# Patient Record
Sex: FEMALE | Race: WHITE | NOT HISPANIC OR LATINO | Employment: OTHER | URBAN - METROPOLITAN AREA
[De-identification: names, ages, dates, MRNs, and addresses within clinical notes are randomized per-mention and may not be internally consistent; named-entity substitution may affect disease eponyms.]

---

## 2017-03-23 PROBLEM — Z86.73 HISTORY OF TIA (TRANSIENT ISCHEMIC ATTACK): Status: ACTIVE | Noted: 2017-03-23

## 2017-08-22 PROBLEM — R06.09 DOE (DYSPNEA ON EXERTION): Status: ACTIVE | Noted: 2017-08-22

## 2017-11-17 PROBLEM — J44.9 CHRONIC OBSTRUCTIVE PULMONARY DISEASE: Status: ACTIVE | Noted: 2017-11-17

## 2017-11-17 PROBLEM — Z99.81 OXYGEN DEPENDENT: Status: ACTIVE | Noted: 2017-11-17

## 2018-07-24 PROBLEM — E55.9 VITAMIN D DEFICIENCY: Status: ACTIVE | Noted: 2018-07-24

## 2019-11-11 PROBLEM — N30.00 ACUTE CYSTITIS WITHOUT HEMATURIA: Status: ACTIVE | Noted: 2019-11-11

## 2019-11-11 PROBLEM — I50.9 ACUTE ON CHRONIC HEART FAILURE: Status: ACTIVE | Noted: 2019-11-11

## 2019-11-12 VITALS
OXYGEN SATURATION: 92 % | RESPIRATION RATE: 18 BRPM | DIASTOLIC BLOOD PRESSURE: 45 MMHG | SYSTOLIC BLOOD PRESSURE: 107 MMHG | TEMPERATURE: 99 F | HEART RATE: 66 BPM

## 2019-11-12 VITALS
TEMPERATURE: 98 F | RESPIRATION RATE: 16 BRPM | SYSTOLIC BLOOD PRESSURE: 111 MMHG | TEMPERATURE: 98 F | DIASTOLIC BLOOD PRESSURE: 55 MMHG | SYSTOLIC BLOOD PRESSURE: 116 MMHG | RESPIRATION RATE: 16 BRPM | OXYGEN SATURATION: 100 % | HEART RATE: 56 BPM | HEART RATE: 75 BPM | OXYGEN SATURATION: 96 % | DIASTOLIC BLOOD PRESSURE: 51 MMHG

## 2019-11-13 VITALS
DIASTOLIC BLOOD PRESSURE: 47 MMHG | SYSTOLIC BLOOD PRESSURE: 119 MMHG | DIASTOLIC BLOOD PRESSURE: 49 MMHG | SYSTOLIC BLOOD PRESSURE: 125 MMHG | HEART RATE: 56 BPM | TEMPERATURE: 99 F | OXYGEN SATURATION: 93 % | TEMPERATURE: 98 F | OXYGEN SATURATION: 94 % | RESPIRATION RATE: 17 BRPM | HEART RATE: 62 BPM

## 2019-11-13 VITALS
RESPIRATION RATE: 18 BRPM | DIASTOLIC BLOOD PRESSURE: 52 MMHG | OXYGEN SATURATION: 92 % | SYSTOLIC BLOOD PRESSURE: 122 MMHG | TEMPERATURE: 98 F | HEART RATE: 62 BPM

## 2019-11-13 VITALS
RESPIRATION RATE: 18 BRPM | DIASTOLIC BLOOD PRESSURE: 41 MMHG | OXYGEN SATURATION: 96 % | SYSTOLIC BLOOD PRESSURE: 108 MMHG | TEMPERATURE: 98 F | HEART RATE: 61 BPM

## 2019-11-13 PROBLEM — J81.0 ACUTE PULMONARY EDEMA: Status: ACTIVE | Noted: 2019-11-13

## 2019-11-14 VITALS
OXYGEN SATURATION: 95 % | SYSTOLIC BLOOD PRESSURE: 121 MMHG | TEMPERATURE: 101 F | SYSTOLIC BLOOD PRESSURE: 115 MMHG | DIASTOLIC BLOOD PRESSURE: 55 MMHG | OXYGEN SATURATION: 91 % | HEART RATE: 58 BPM | DIASTOLIC BLOOD PRESSURE: 57 MMHG | HEART RATE: 64 BPM | RESPIRATION RATE: 16 BRPM | TEMPERATURE: 98 F | RESPIRATION RATE: 20 BRPM

## 2019-11-14 PROBLEM — R33.9 URINE RETENTION: Status: ACTIVE | Noted: 2019-11-14

## 2019-11-15 VITALS
DIASTOLIC BLOOD PRESSURE: 57 MMHG | TEMPERATURE: 98 F | SYSTOLIC BLOOD PRESSURE: 144 MMHG | HEART RATE: 66 BPM | RESPIRATION RATE: 18 BRPM | OXYGEN SATURATION: 96 %

## 2019-11-15 VITALS
DIASTOLIC BLOOD PRESSURE: 43 MMHG | SYSTOLIC BLOOD PRESSURE: 103 MMHG | OXYGEN SATURATION: 95 % | TEMPERATURE: 99 F | RESPIRATION RATE: 17 BRPM | HEART RATE: 55 BPM

## 2019-11-15 PROBLEM — R82.71 ASYMPTOMATIC BACTERIURIA: Status: ACTIVE | Noted: 2019-11-11

## 2019-11-16 VITALS
OXYGEN SATURATION: 93 % | HEART RATE: 51 BPM | TEMPERATURE: 98 F | DIASTOLIC BLOOD PRESSURE: 49 MMHG | RESPIRATION RATE: 15 BRPM | SYSTOLIC BLOOD PRESSURE: 124 MMHG

## 2019-11-16 VITALS
SYSTOLIC BLOOD PRESSURE: 132 MMHG | OXYGEN SATURATION: 95 % | TEMPERATURE: 98 F | SYSTOLIC BLOOD PRESSURE: 122 MMHG | HEART RATE: 67 BPM | RESPIRATION RATE: 18 BRPM | DIASTOLIC BLOOD PRESSURE: 52 MMHG | HEART RATE: 53 BPM | TEMPERATURE: 98 F | OXYGEN SATURATION: 95 % | RESPIRATION RATE: 15 BRPM | DIASTOLIC BLOOD PRESSURE: 48 MMHG

## 2019-11-16 VITALS
SYSTOLIC BLOOD PRESSURE: 121 MMHG | HEART RATE: 54 BPM | DIASTOLIC BLOOD PRESSURE: 54 MMHG | TEMPERATURE: 98 F | OXYGEN SATURATION: 92 % | RESPIRATION RATE: 16 BRPM

## 2019-11-16 VITALS
OXYGEN SATURATION: 91 % | DIASTOLIC BLOOD PRESSURE: 44 MMHG | TEMPERATURE: 98 F | RESPIRATION RATE: 14 BRPM | HEART RATE: 61 BPM | SYSTOLIC BLOOD PRESSURE: 124 MMHG

## 2019-11-16 VITALS
TEMPERATURE: 98 F | SYSTOLIC BLOOD PRESSURE: 123 MMHG | DIASTOLIC BLOOD PRESSURE: 40 MMHG | RESPIRATION RATE: 16 BRPM | HEART RATE: 54 BPM | OXYGEN SATURATION: 98 %

## 2019-11-16 VITALS
TEMPERATURE: 98 F | SYSTOLIC BLOOD PRESSURE: 108 MMHG | DIASTOLIC BLOOD PRESSURE: 39 MMHG | RESPIRATION RATE: 17 BRPM | HEART RATE: 56 BPM | OXYGEN SATURATION: 93 %

## 2019-11-17 VITALS
DIASTOLIC BLOOD PRESSURE: 48 MMHG | OXYGEN SATURATION: 91 % | TEMPERATURE: 98 F | SYSTOLIC BLOOD PRESSURE: 137 MMHG | RESPIRATION RATE: 18 BRPM | HEART RATE: 61 BPM

## 2019-11-17 VITALS
HEART RATE: 65 BPM | DIASTOLIC BLOOD PRESSURE: 48 MMHG | RESPIRATION RATE: 18 BRPM | TEMPERATURE: 98 F | SYSTOLIC BLOOD PRESSURE: 130 MMHG | TEMPERATURE: 99 F | OXYGEN SATURATION: 97 % | OXYGEN SATURATION: 90 % | SYSTOLIC BLOOD PRESSURE: 110 MMHG | RESPIRATION RATE: 16 BRPM | HEART RATE: 51 BPM | DIASTOLIC BLOOD PRESSURE: 56 MMHG

## 2019-11-17 VITALS
HEART RATE: 58 BPM | TEMPERATURE: 98 F | RESPIRATION RATE: 16 BRPM | OXYGEN SATURATION: 99 % | DIASTOLIC BLOOD PRESSURE: 56 MMHG | SYSTOLIC BLOOD PRESSURE: 130 MMHG

## 2019-11-17 VITALS
TEMPERATURE: 98 F | RESPIRATION RATE: 14 BRPM | DIASTOLIC BLOOD PRESSURE: 47 MMHG | SYSTOLIC BLOOD PRESSURE: 118 MMHG | OXYGEN SATURATION: 95 % | HEART RATE: 56 BPM

## 2019-11-18 VITALS
TEMPERATURE: 98 F | HEART RATE: 52 BPM | RESPIRATION RATE: 16 BRPM | OXYGEN SATURATION: 96 % | DIASTOLIC BLOOD PRESSURE: 50 MMHG | SYSTOLIC BLOOD PRESSURE: 122 MMHG

## 2019-11-18 VITALS
HEART RATE: 58 BPM | OXYGEN SATURATION: 92 % | DIASTOLIC BLOOD PRESSURE: 44 MMHG | TEMPERATURE: 99 F | RESPIRATION RATE: 18 BRPM | SYSTOLIC BLOOD PRESSURE: 130 MMHG

## 2020-02-14 PROBLEM — J44.1 COPD WITH ACUTE EXACERBATION: Status: ACTIVE | Noted: 2020-02-14

## 2020-02-17 PROBLEM — J81.0 ACUTE PULMONARY EDEMA: Status: RESOLVED | Noted: 2019-11-13 | Resolved: 2020-02-17

## 2020-03-10 PROBLEM — I48.0 PAROXYSMAL A-FIB: Status: ACTIVE | Noted: 2020-03-10

## 2020-03-10 PROBLEM — J42 CHRONIC BRONCHITIS: Status: RESOLVED | Noted: 2020-03-10 | Resolved: 2020-03-10

## 2020-03-10 PROBLEM — R06.00 DYSPNEA: Status: ACTIVE | Noted: 2020-03-10

## 2020-03-10 PROBLEM — J42 CHRONIC BRONCHITIS: Status: ACTIVE | Noted: 2020-03-10

## 2020-03-10 PROBLEM — J96.20 ACUTE ON CHRONIC RESPIRATORY FAILURE: Status: ACTIVE | Noted: 2020-03-10

## 2020-03-12 PROBLEM — M1A.9XX1: Status: ACTIVE | Noted: 2020-03-12

## 2020-03-13 PROBLEM — Z71.89 GOALS OF CARE, COUNSELING/DISCUSSION: Status: ACTIVE | Noted: 2020-03-13

## 2020-03-13 PROBLEM — R06.00 DYSPNEA: Status: RESOLVED | Noted: 2020-03-10 | Resolved: 2020-03-13

## 2020-03-14 PROBLEM — R06.09 DOE (DYSPNEA ON EXERTION): Status: RESOLVED | Noted: 2017-08-22 | Resolved: 2020-03-14

## 2020-03-14 PROBLEM — J18.9 MULTIFOCAL PNEUMONIA: Status: ACTIVE | Noted: 2020-03-14

## 2021-02-06 PROBLEM — Z71.89 ADVANCED CARE PLANNING/COUNSELING DISCUSSION: Status: ACTIVE | Noted: 2021-02-06

## 2021-02-06 PROBLEM — J44.1 COPD EXACERBATION: Status: ACTIVE | Noted: 2021-02-06

## 2021-05-10 PROBLEM — Z71.89 ACP (ADVANCE CARE PLANNING): Status: ACTIVE | Noted: 2021-05-10

## 2021-05-10 PROBLEM — J44.1 ACUTE EXACERBATION OF CHRONIC OBSTRUCTIVE PULMONARY DISEASE (COPD): Status: ACTIVE | Noted: 2021-05-10

## 2021-09-11 PROBLEM — J96.21 ACUTE ON CHRONIC RESPIRATORY FAILURE WITH HYPOXIA: Status: ACTIVE | Noted: 2020-03-10

## 2021-10-04 ENCOUNTER — SPECIALTY PHARMACY (OUTPATIENT)
Dept: PHARMACY | Facility: CLINIC | Age: 86
End: 2021-10-04

## 2021-10-07 ENCOUNTER — SPECIALTY PHARMACY (OUTPATIENT)
Dept: PHARMACY | Facility: CLINIC | Age: 86
End: 2021-10-07

## 2021-10-15 PROBLEM — I05.0 MODERATE MITRAL STENOSIS: Status: ACTIVE | Noted: 2021-10-15

## 2021-10-30 ENCOUNTER — SPECIALTY PHARMACY (OUTPATIENT)
Dept: PHARMACY | Facility: CLINIC | Age: 86
End: 2021-10-30
Payer: MEDICARE

## 2021-11-23 ENCOUNTER — SPECIALTY PHARMACY (OUTPATIENT)
Dept: PHARMACY | Facility: CLINIC | Age: 86
End: 2021-11-23
Payer: MEDICARE

## 2021-11-23 ENCOUNTER — PATIENT MESSAGE (OUTPATIENT)
Dept: PHARMACY | Facility: CLINIC | Age: 86
End: 2021-11-23
Payer: MEDICARE

## 2021-11-23 DIAGNOSIS — N18.30 STAGE 3 CHRONIC KIDNEY DISEASE, UNSPECIFIED WHETHER STAGE 3A OR 3B CKD: Primary | ICD-10-CM

## 2021-12-22 ENCOUNTER — SPECIALTY PHARMACY (OUTPATIENT)
Dept: PHARMACY | Facility: CLINIC | Age: 86
End: 2021-12-22
Payer: MEDICARE

## 2021-12-30 ENCOUNTER — SPECIALTY PHARMACY (OUTPATIENT)
Dept: PHARMACY | Facility: CLINIC | Age: 86
End: 2021-12-30
Payer: MEDICARE

## 2021-12-30 NOTE — TELEPHONE ENCOUNTER
[facility] received new dose of Procrit. Reduced to 3000 unit every 21 days. When we last spoke with nurse at [facility] dose was 5000 unit every 21 days. Inbasket message sent to provider to confirm which dose is correct.

## 2022-01-15 PROBLEM — U07.1 COVID-19 VIRUS INFECTION: Status: ACTIVE | Noted: 2022-01-15

## 2022-01-15 PROBLEM — J96.11 CHRONIC HYPOXEMIC RESPIRATORY FAILURE: Status: ACTIVE | Noted: 2022-01-15

## 2022-01-15 PROBLEM — D68.69 HYPERCOAGULABLE STATE ASSOCIATED WITH COVID-19: Status: ACTIVE | Noted: 2022-01-15

## 2022-01-15 PROBLEM — R79.89 TROPONIN LEVEL ELEVATED: Status: ACTIVE | Noted: 2022-01-15

## 2022-01-15 PROBLEM — U07.1 HYPERCOAGULABLE STATE ASSOCIATED WITH COVID-19: Status: ACTIVE | Noted: 2022-01-15

## 2022-01-17 PROBLEM — E87.6 HYPOKALEMIA: Status: ACTIVE | Noted: 2022-01-17

## 2022-01-25 PROBLEM — J96.01 ACUTE HYPOXEMIC RESPIRATORY FAILURE DUE TO COVID-19: Status: ACTIVE | Noted: 2022-01-25

## 2022-01-25 PROBLEM — U07.1 ACUTE HYPOXEMIC RESPIRATORY FAILURE DUE TO COVID-19: Status: ACTIVE | Noted: 2022-01-25

## 2022-01-25 PROBLEM — U07.1 PNEUMONIA DUE TO COVID-19 VIRUS: Status: ACTIVE | Noted: 2022-01-25

## 2022-01-25 PROBLEM — J12.82 PNEUMONIA DUE TO COVID-19 VIRUS: Status: ACTIVE | Noted: 2022-01-25

## 2022-01-26 PROBLEM — Z71.89 ACP (ADVANCE CARE PLANNING): Status: RESOLVED | Noted: 2021-05-10 | Resolved: 2022-01-26

## 2022-02-16 PROBLEM — N30.00 ACUTE CYSTITIS: Status: ACTIVE | Noted: 2022-02-16

## 2022-02-16 PROBLEM — R06.03 RESPIRATORY DISTRESS: Status: ACTIVE | Noted: 2022-02-16

## 2022-02-18 PROBLEM — B95.2 ENTEROCOCCUS UTI: Status: ACTIVE | Noted: 2022-02-16

## 2022-02-18 PROBLEM — N39.0 ENTEROCOCCUS UTI: Status: ACTIVE | Noted: 2022-02-16

## 2022-02-18 PROBLEM — R79.89 TROPONIN LEVEL ELEVATED: Status: RESOLVED | Noted: 2022-01-15 | Resolved: 2022-02-18

## 2022-02-18 PROBLEM — D63.8 ANEMIA OF CHRONIC DISEASE: Status: ACTIVE | Noted: 2022-02-18

## 2022-02-18 PROBLEM — J96.10 CHRONIC RESPIRATORY FAILURE: Status: ACTIVE | Noted: 2022-02-18

## 2022-02-24 ENCOUNTER — SPECIALTY PHARMACY (OUTPATIENT)
Dept: PHARMACY | Facility: CLINIC | Age: 87
End: 2022-02-24
Payer: MEDICARE

## 2022-02-24 NOTE — TELEPHONE ENCOUNTER
Specialty Pharmacy - Refill Coordination  Specialty Pharmacy - Clinical Intervention    Specialty Medication Orders Linked to Encounter    Flowsheet Row Most Recent Value   Medication #1 epoetin merlin (PROCRIT) 10,000 unit/mL injection (Order#[id number], Rx#[id number])          Refill Questions - Documented Responses    Flowsheet Row Most Recent Value   Patient Availability and HIPAA Verification    Does patient want to proceed with activity? Yes   HIPAA/medical authority confirmed? Yes   Relationship to patient of person spoken to? Child   Refill Screening Questions    Changes to allergies? No   Changes to medications? No   New conditions since last clinic visit? No   Unplanned office visit, urgent care, ED, or hospital admission in the last 4 weeks? No   How does patient/caregiver feel medication is working? Excellent   Financial problems or insurance changes? No   How many doses of your specialty medications were missed in the last 4 weeks? 0   Would patient like to speak to a pharmacist? No   When does the patient need to receive the medication? 03/02/22  [this is an estimate]   Refill Delivery Questions    How will the patient receive the medication? Delivery Stacey   When does the patient need to receive the medication? 03/02/22  [this is an estimate]   Shipping Address Home   Address in [facility] confirmed and updated if neccessary? Yes   Expected Copay ($) 47   Is the patient able to afford the medication copay? Yes   Payment Method CC on file   Days supply of Refill 28   Supplies needed? Alcohol Swabs   Refill activity completed? Yes   Refill activity plan Refill scheduled   Shipment/Pickup Date: 02/25/22          Current Outpatient Medications   Medication Sig    acetaminophen (TYLENOL) 325 mg Cap Take 650 mg by mouth 4 (four) times daily as needed (pain).    albuterol (PROVENTIL) 2.5 mg /3 mL (0.083 %) nebulizer solution Take 3 mLs (2.5 mg total) by nebulization 3 (three) times daily. Rescue     albuterol (PROVENTIL/VENTOLIN HFA) 90 mcg/actuation inhaler Inhale 1 puff into the lungs every 6 (six) hours as needed (dyspnea). Rescue    allopurinoL (ZYLOPRIM) 100 MG tablet Take 1 tablet (100 mg total) by mouth once daily.    aMILoride (MIDAMOR) 5 MG Tab Take 1 tablet (5 mg total) by mouth once daily.    aspirin (ECOTRIN) 81 MG EC tablet Take 81 mg by mouth nightly.     calcitonin, salmon, (FORTICAL) 200 unit/actuation nasal spray 1 spray by Nasal route once daily.    cholecalciferol, vitamin D3, (VITAMIN D3) 25 mcg (1,000 unit) capsule Take 1 capsule (1,000 Units total) by mouth once a week. (Patient taking differently: Take 1,000 Units by mouth every Wednesday.)    cinacalcet (SENSIPAR) 30 MG Tab Take 1 tablet (30 mg total) by mouth once a week. (Patient taking differently: Take 30 mg by mouth every Wednesday.)    D-MANNOSE ORAL Take 500 mg by mouth once daily.     diltiaZEM (DILT-XR) 240 MG CDCR Take 1 capsule (240 mg total) by mouth once daily. Do not crush or chew (Patient taking differently: Take 240 mg by mouth every evening. Do not crush or chew)    diphenoxylate-atropine 2.5-0.025 mg (LOMOTIL) 2.5-0.025 mg per tablet Take 1 tablet by mouth every 8 (eight) hours as needed for Diarrhea.     docusate sodium (COLACE) 100 MG capsule Take 200 mg by mouth once daily.    epoetin merlin (EPOGEN) 10,000 unit/mL injection Inject 1 mL (10,000 Units total) into the skin every 14 (fourteen) days.    epoetin merlin (PROCRIT) 10,000 unit/mL injection Inject 1 mL (10,000 Units total) into the skin every 14 (fourteen) days.    ferric citrate (AURYXIA) 210 mg iron Tab Take 1 tablet by mouth every other day. Take by mouth every other day    fluticasone-umeclidin-vilanter (TRELEGY ELLIPTA) 200-62.5-25 mcg inhaler Inhale 1 puff into the lungs once daily.    furosemide (LASIX) 80 MG tablet TAKE ONE TABLET BY MOUTH TWICE DAILY (Patient taking differently: Take 80 mg by mouth 2 (two) times a day.)     "L.acidophil,parac-S.therm-Bif. (RISAQUAD) Cap capsule Take 1 capsule by mouth once daily.    loperamide (IMODIUM A-D) 2 mg Tab Take 2 mg by mouth 4 (four) times daily as needed (diarrhea).     mineral oil liquid Take 30 mLs by mouth daily as needed for Constipation.    multivit,iron,minerals/lutein (CENTRUM SILVER ULTRA WOMEN'S ORAL) Take 1 tablet by mouth once daily.    nystatin (MYCOSTATIN) cream Apply 1 g topically 2 (two) times daily as needed (rash).     ondansetron (ZOFRAN-ODT) 4 MG TbDL Take 4 mg by mouth every 6 (six) hours as needed (nausea).    OXYGEN-AIR DELIVERY SYSTEMS MISC Inhale 2 L into the lungs as needed (shortness of breath).    phenyleph/mineral oil/petrolat (PREPARATION H RECT) Place 1 application rectally every 12 (twelve) hours as needed (hemorrhoids).     polyethylene glycol (GLYCOLAX) 17 gram PwPk Take 17 g by mouth daily as needed (constipation).    PROTEINEX 15-60 gram-kcal/30 mL Liqd Take 30 mLs by mouth 2 (two) times daily.    roflumilast (DALIRESP) 500 mcg Tab Take 500 mcg by mouth once daily.    rosuvastatin (CRESTOR) 20 MG tablet TAKE 1 TABLET BY MOUTH AT 6PM (Patient taking differently: Take 20 mg by mouth every evening.)    sennosides (SENNA LAX ORAL) Take 2 tablets by mouth every evening.     sodium chloride 7% 7 % nebulizer solution Take 4 mLs by nebulization once daily.    TOVIAZ 8 mg Tb24 TAKE 1 TABLET BY MOUTH AT 6PM (Patient taking differently: Take 1 tablet by mouth every evening.)    triamcinolone (NASACORT) 55 mcg nasal inhaler 2 sprays by Nasal route every evening. Alternating nostrils    venlafaxine (EFFEXOR-XR) 37.5 MG 24 hr capsule Take 1 capsule (37.5 mg total) by mouth once daily.   Last reviewed on 2/16/2022  9:41 AM by Fang Oneil    Review of patient's allergies indicates:   Allergen Reactions    Betapace [sotalol]      "almost coded"    Pcn [penicillins] Other (See Comments)     Difficulty breaking     Penicillin Anaphylaxis    Pradaxa " [dabigatran etexilate] Other (See Comments)     Gastric bleeding     Codeine Nausea And Vomiting    Naftin [naftifine] Rash    Last reviewed on  2/16/2022 6:39 AM by Kathy Cowart    Spoke with Tiny. She denied missed doses. She reported a 0-day supply in her possession. She reported she has a vial that is partially used. Next dose is due on 3/2--this is an estimate, Tiny was not sure. Reviewed dose change--she will now be injecting 1 mL into the skin every 14 days. She denied any changes in allergies, medical conditions, or medications. Delivery JUDD  scheduled for 2/25 for delivery on 2/25. $47 copay at 004. Confirmed 2 patient identifiers, allergies, medication list, comorbidities, shipping address, and payment information.    Tasks added this encounter   3/23/2022 - Refill Call (Auto Added)   Tasks due within next 3 months   No tasks due.     David Finn, PharmD  [facility]  [address] [address]  Phone: [phone]  Fax: [phone]

## 2022-03-24 ENCOUNTER — PATIENT MESSAGE (OUTPATIENT)
Dept: PHARMACY | Facility: CLINIC | Age: 87
End: 2022-03-24
Payer: MEDICARE

## 2022-03-26 PROBLEM — D62 ACUTE BLOOD LOSS ANEMIA: Status: ACTIVE | Noted: 2022-03-26

## 2022-03-26 PROBLEM — K92.2 LOWER GI BLEED: Status: ACTIVE | Noted: 2022-03-26

## 2022-03-30 ENCOUNTER — SPECIALTY PHARMACY (OUTPATIENT)
Dept: PHARMACY | Facility: CLINIC | Age: 87
End: 2022-03-30
Payer: MEDICARE

## 2022-03-30 NOTE — TELEPHONE ENCOUNTER
Received call from nurse at [facility] to schedule Procrit delivery. However, she reported that the dose was changed last week and she will fax new dose to [facility]. Forwarding to team in order to be on the lookout for new rx and schedule delivery accordingly.

## 2022-03-31 NOTE — TELEPHONE ENCOUNTER
Prescription for Retacrit 20,000 units subcutaneously into the skin weekly received. She was previously on Procrit 10,000 units subcutaneously into the skin every 14 days.     Retacrit would require new PA and would like to request a prescription for Procrit given the time sensitive nature of this matter/to ensure Mrs. Morales can initiate dose change as soon as possible.     Called Dr. Howe's office at [phone] to verify dose increase and to request a verbal for Procrit instead and to obtain most recent office visit notes and labs. Spoke to ELAN Walsh. She confirmed we can change from Retacrit to Procrit in the interest of time. I inquired about dose change and she reported her hemoglobin reached a critical level and this was the rationale for increasing dose to this extent. I requested she send most recent office visit notes and labs to [phone]. She reported she would.     [facility] closed.

## 2022-03-31 NOTE — TELEPHONE ENCOUNTER
Specialty Pharmacy - Clinical Intervention  Specialty Pharmacy - Medication/Referral Authorization  Specialty Pharmacy - Refill Coordination    Specialty Medication Orders Linked to Encounter    Flowsheet Row Most Recent Value   Medication #1 epoetin merlin (PROCRIT) 20,000 unit/mL injection (Order#[id number], Rx#[id number])          Refill Questions - Documented Responses    Flowsheet Row Most Recent Value   Refill Screening Questions    Changes to allergies? No   Changes to medications? No   New conditions since last clinic visit? No   Unplanned office visit, urgent care, ED, or hospital admission in the last 4 weeks? No   How does patient/caregiver feel medication is working? Excellent   Financial problems or insurance changes? No   How many doses of your specialty medications were missed in the last 4 weeks? 0   Would patient like to speak to a pharmacist? No   When does the patient need to receive the medication? 03/30/22   Refill Delivery Questions    How will the patient receive the medication? Delivery Stacey   When does the patient need to receive the medication? 03/30/22   Shipping Address Temporary   Address in [facility] confirmed and updated if neccessary? Yes   Expected Copay ($) 604.92   Is the patient able to afford the medication copay? Yes   Payment Method CC on file   Days supply of Refill 28   Supplies needed? No supplies needed   Refill activity completed? Yes   Shipment/Pickup Date: 04/01/22          Current Outpatient Medications   Medication Sig    acetaminophen (TYLENOL) 325 mg Cap Take 650 mg by mouth 4 (four) times daily as needed (pain).    albuterol (PROVENTIL) 2.5 mg /3 mL (0.083 %) nebulizer solution Take 3 mLs (2.5 mg total) by nebulization 3 (three) times daily. Rescue    allopurinoL (ZYLOPRIM) 100 MG tablet Take 1 tablet (100 mg total) by mouth once daily.    aMILoride (MIDAMOR) 5 MG Tab Take 1 tablet (5 mg total) by mouth once daily.    aspirin (ECOTRIN) 81 MG EC tablet  Take 81 mg by mouth nightly.     calcitonin, salmon, (FORTICAL) 200 unit/actuation nasal spray 1 spray by Nasal route once daily.    cholecalciferol, vitamin D3, (VITAMIN D3) 25 mcg (1,000 unit) capsule Take 1 capsule (1,000 Units total) by mouth once a week. (Patient taking differently: Take 1,000 Units by mouth every Wednesday.)    cinacalcet (SENSIPAR) 30 MG Tab Take 1 tablet (30 mg total) by mouth once a week. (Patient taking differently: Take 30 mg by mouth every Wednesday.)    D-MANNOSE ORAL Take 500 mg by mouth once daily.     DALIRESP 500 mcg Tab TAKE ONE TABLET BY MOUTH ONCE DAILY    diltiaZEM (DILT-XR) 240 MG CDCR Take 1 capsule (240 mg total) by mouth once daily. Do not crush or chew (Patient taking differently: Take 240 mg by mouth every evening. Do not crush or chew)    diphenoxylate-atropine 2.5-0.025 mg (LOMOTIL) 2.5-0.025 mg per tablet Take 1 tablet by mouth every 8 (eight) hours as needed for Diarrhea.     docusate sodium (COLACE) 100 MG capsule Take 200 mg by mouth once daily.    epoetin merlin (PROCRIT) 20,000 unit/mL injection Inject 1 mL (20,000 Units total) into the skin every 7 days.    ferric citrate (AURYXIA) 210 mg iron Tab Take 1 tablet by mouth every other day. Take by mouth every other day (Patient taking differently: Take 1 tablet by mouth every other day.)    fluticasone-umeclidin-vilanter (TRELEGY ELLIPTA) 200-62.5-25 mcg inhaler Inhale 1 puff into the lungs once daily.    furosemide (LASIX) 80 MG tablet TAKE ONE TABLET BY MOUTH TWICE DAILY (Patient taking differently: Take 80 mg by mouth 2 (two) times a day.)    L.acidophil,parac-S.therm-Bif. (RISAQUAD) Cap capsule Take 1 capsule by mouth once daily.    loperamide (IMODIUM A-D) 2 mg Tab Take 2 mg by mouth 4 (four) times daily as needed (diarrhea).     mineral oil liquid Take 30 mLs by mouth daily as needed for Constipation.    multivit,iron,minerals/lutein (CENTRUM SILVER ULTRA WOMEN'S ORAL) Take 1 tablet by mouth once  "daily.    nystatin (MYCOSTATIN) cream Apply 1 g topically 2 (two) times daily as needed (rash).     ondansetron (ZOFRAN-ODT) 4 MG TbDL Take 4 mg by mouth every 6 (six) hours as needed (nausea).    OXYGEN-AIR DELIVERY SYSTEMS MISC Inhale 2 L into the lungs as needed (shortness of breath).    pantoprazole (PROTONIX) 40 MG tablet Take 40 mg by mouth once daily.    phenyleph/mineral oil/petrolat (PREPARATION H RECT) Place 1 application rectally every 12 (twelve) hours as needed (hemorrhoids).     PROTEINEX 15-60 gram-kcal/30 mL Liqd Take 30 mLs by mouth 2 (two) times daily.    rosuvastatin (CRESTOR) 20 MG tablet TAKE 1 TABLET BY MOUTH AT 6PM (Patient taking differently: Take 20 mg by mouth every evening.)    senna (SENOKOT) 8.6 mg tablet Take 2 tablets by mouth every evening.     sodium chloride 7% 7 % nebulizer solution Take 4 mLs by nebulization once daily.    TOVIAZ 8 mg Tb24 TAKE 1 TABLET BY MOUTH AT 6PM (Patient taking differently: Take 1 tablet by mouth every evening.)    traZODone (DESYREL) 50 MG tablet Take 50 mg by mouth nightly as needed for Insomnia.    triamcinolone (NASACORT) 55 mcg nasal inhaler 2 sprays by Nasal route every evening. Alternating nostrils    venlafaxine (EFFEXOR-XR) 37.5 MG 24 hr capsule Take 1 capsule (37.5 mg total) by mouth once daily.   Last reviewed on 3/28/2022  8:04 AM by Fabian Murguia CRNA    Review of patient's allergies indicates:   Allergen Reactions    Betapace [sotalol]      "almost coded"    Pcn [penicillins] Other (See Comments)     Difficulty breaking     Penicillin Anaphylaxis    Pradaxa [dabigatran etexilate] Other (See Comments)     Gastric bleeding     Codeine Nausea And Vomiting    Naftin [naftifine] Rash    Last reviewed on  3/28/2022 8:04 AM by Marybeth Murguia    Called Mrs. Morales' daughter to review dose change and to confirm they can pay for $604.92 and to explain that there is no FA available. Tiny denied any questions or concerns " regarding dose change. Obtained permission to send to [facility]. Informed her that I would call Dr. Howe's office to inform them of high copay, that FA is not available and that they should look into buy and bill for future fills.     Called Dr. Howe's office at [phone] to inform them of price increase and that they may have to do buy and bill for future fills. ELAN Walsh reported she would have to consult with manager. ELAN Walsh returned to line and she reported that manager confirmed they can do buy and bill next month if she still needs high dose.     Called [facility] at [phone]. Spoke to ELAN Garces, confirmed address and date for delivery. Reviewed dose change and she denied any questions or concerns. Confirmed with ELAN Hadley that next dose was due on 3/30 but that they will administer on 4/1.     Tasks added this encounter   No tasks added.   Tasks due within next 3 months   3/23/2022 - Refill Call (Auto Added)  3/24/2022 - Welcome Call  3/24/2022 - Referral Authorization     David Finn, PharmD  [facility] Specialty Pharm[facility]  [address] [address]  Phone: [phone]  Fax: [phone]

## 2022-04-18 ENCOUNTER — PATIENT MESSAGE (OUTPATIENT)
Dept: ADMINISTRATIVE | Facility: OTHER | Age: 87
End: 2022-04-18
Payer: MEDICARE

## 2022-04-22 ENCOUNTER — SPECIALTY PHARMACY (OUTPATIENT)
Dept: PHARMACY | Facility: CLINIC | Age: 87
End: 2022-04-22
Payer: MEDICARE

## 2022-04-22 NOTE — TELEPHONE ENCOUNTER
Called pt's facility. Spoke with Kevan. Stated that pt's rx is no longer 20,000 units q 7days but now changed to 10,000 units q 3 weeks    Has 2 vials of 20,000 units/1 mL (total-40,000 units) left which is about a 12 week (3 month) supply    Sent message to provider to request rx with updated instructions and will pend refill

## 2022-05-10 PROBLEM — N25.81 SECONDARY HYPERPARATHYROIDISM OF RENAL ORIGIN: Status: ACTIVE | Noted: 2022-05-10

## 2022-05-10 PROBLEM — N18.32 ANEMIA OF CHRONIC RENAL FAILURE, STAGE 3B: Status: ACTIVE | Noted: 2022-05-10

## 2022-05-10 PROBLEM — E88.09 HYPOALBUMINEMIA: Status: ACTIVE | Noted: 2022-05-10

## 2022-05-10 PROBLEM — D63.1 ANEMIA OF CHRONIC RENAL FAILURE, STAGE 3B: Status: ACTIVE | Noted: 2022-05-10

## 2022-05-10 PROBLEM — E83.39 HYPERPHOSPHATEMIA: Status: ACTIVE | Noted: 2022-05-10

## 2022-05-18 NOTE — TELEPHONE ENCOUNTER
Received faxed rx dated 4/7/22 from provider's office with updated rx for 10,000 units SQ every 3 weeks    PA not required and Copay $72.30.    Pt last saw provider on 5/10/22 and notes shows that pt is on 10,000 units every 3 weeks. Will call provider's office at [phone] for dose clarification     Spoke with Jillian. Stated that note showed that pt is now on 5,000 units every 3 weeks but she is double checking the clinic's book. Confirmed that pt is to be taking 10,000 units every week.     Jillian stated that she will send a new rx for dose of 10,000 units weekly to [facility]. Will disregard rx sent for 10,000 units every 3 weeks.    Opened [facility]

## 2022-05-20 PROBLEM — N30.00 ACUTE CYSTITIS WITHOUT HEMATURIA: Status: ACTIVE | Noted: 2022-05-20

## 2022-06-09 ENCOUNTER — SPECIALTY PHARMACY (OUTPATIENT)
Dept: PHARMACY | Facility: CLINIC | Age: 87
End: 2022-06-09
Payer: MEDICARE

## 2022-06-09 NOTE — TELEPHONE ENCOUNTER
Outgoing call to pt to check status of Procrit. Pts daughter answered and stated pt passed away. Closing out at [facility]. Informed daughter to call if she needs anything.